# Patient Record
Sex: FEMALE | Race: WHITE | NOT HISPANIC OR LATINO
[De-identification: names, ages, dates, MRNs, and addresses within clinical notes are randomized per-mention and may not be internally consistent; named-entity substitution may affect disease eponyms.]

---

## 2017-01-30 PROBLEM — Z00.00 ENCOUNTER FOR PREVENTIVE HEALTH EXAMINATION: Status: ACTIVE | Noted: 2017-01-30

## 2017-09-14 ENCOUNTER — APPOINTMENT (OUTPATIENT)
Dept: DERMATOLOGY | Facility: CLINIC | Age: 77
End: 2017-09-14
Payer: MEDICARE

## 2017-09-14 PROCEDURE — 99203 OFFICE O/P NEW LOW 30 MIN: CPT

## 2018-02-08 ENCOUNTER — IMPORTED ENCOUNTER (OUTPATIENT)
Dept: URBAN - METROPOLITAN AREA CLINIC 50 | Facility: CLINIC | Age: 78
End: 2018-02-08

## 2018-07-27 ENCOUNTER — TRANSCRIPTION ENCOUNTER (OUTPATIENT)
Age: 78
End: 2018-07-27

## 2018-10-04 ENCOUNTER — APPOINTMENT (OUTPATIENT)
Dept: DERMATOLOGY | Facility: CLINIC | Age: 78
End: 2018-10-04

## 2018-10-05 ENCOUNTER — APPOINTMENT (OUTPATIENT)
Dept: DERMATOLOGY | Facility: CLINIC | Age: 78
End: 2018-10-05
Payer: MEDICARE

## 2018-10-05 PROCEDURE — 99214 OFFICE O/P EST MOD 30 MIN: CPT

## 2019-06-24 ENCOUNTER — OUTPATIENT (OUTPATIENT)
Dept: OUTPATIENT SERVICES | Facility: HOSPITAL | Age: 79
LOS: 1 days | Discharge: HOME | End: 2019-06-24

## 2019-06-24 VITALS
OXYGEN SATURATION: 97 % | RESPIRATION RATE: 14 BRPM | TEMPERATURE: 98 F | HEART RATE: 68 BPM | SYSTOLIC BLOOD PRESSURE: 147 MMHG | DIASTOLIC BLOOD PRESSURE: 69 MMHG

## 2019-06-24 VITALS
OXYGEN SATURATION: 100 % | SYSTOLIC BLOOD PRESSURE: 156 MMHG | HEART RATE: 72 BPM | DIASTOLIC BLOOD PRESSURE: 90 MMHG | TEMPERATURE: 98 F | RESPIRATION RATE: 14 BRPM | WEIGHT: 167.99 LBS | HEIGHT: 65 IN

## 2019-06-24 DIAGNOSIS — Z90.49 ACQUIRED ABSENCE OF OTHER SPECIFIED PARTS OF DIGESTIVE TRACT: Chronic | ICD-10-CM

## 2019-06-24 DIAGNOSIS — Z90.89 ACQUIRED ABSENCE OF OTHER ORGANS: Chronic | ICD-10-CM

## 2019-06-24 DIAGNOSIS — Z98.890 OTHER SPECIFIED POSTPROCEDURAL STATES: Chronic | ICD-10-CM

## 2019-06-24 DIAGNOSIS — Z90.11 ACQUIRED ABSENCE OF RIGHT BREAST AND NIPPLE: Chronic | ICD-10-CM

## 2019-06-24 RX ORDER — TRAMADOL HYDROCHLORIDE 50 MG/1
1 TABLET ORAL
Qty: 0 | Refills: 0 | DISCHARGE

## 2019-06-24 RX ORDER — LISINOPRIL 2.5 MG/1
1 TABLET ORAL
Qty: 0 | Refills: 0 | DISCHARGE

## 2019-06-24 RX ORDER — ASPIRIN/CALCIUM CARB/MAGNESIUM 324 MG
1 TABLET ORAL
Qty: 0 | Refills: 0 | DISCHARGE

## 2019-06-24 RX ORDER — DULOXETINE HYDROCHLORIDE 30 MG/1
1 CAPSULE, DELAYED RELEASE ORAL
Qty: 0 | Refills: 0 | DISCHARGE

## 2019-06-24 RX ORDER — RALOXIFENE HYDROCHLORIDE 60 MG/1
1 TABLET, COATED ORAL
Qty: 0 | Refills: 0 | DISCHARGE

## 2019-06-24 RX ORDER — ALPRAZOLAM 0.25 MG
1 TABLET ORAL
Qty: 0 | Refills: 0 | DISCHARGE

## 2019-06-24 RX ORDER — LEVOTHYROXINE SODIUM 125 MCG
1 TABLET ORAL
Qty: 0 | Refills: 0 | DISCHARGE

## 2019-06-24 RX ORDER — TROPICAMIDE 1 %
1 DROPS OPHTHALMIC (EYE)
Refills: 0 | Status: COMPLETED | OUTPATIENT
Start: 2019-06-24 | End: 2019-06-24

## 2019-06-24 RX ORDER — ACETAMINOPHEN 500 MG
650 TABLET ORAL EVERY 6 HOURS
Refills: 0 | Status: DISCONTINUED | OUTPATIENT
Start: 2019-06-24 | End: 2019-07-09

## 2019-06-24 RX ORDER — SIMVASTATIN 20 MG/1
1 TABLET, FILM COATED ORAL
Qty: 0 | Refills: 0 | DISCHARGE

## 2019-06-24 RX ORDER — PHENYLEPHRINE HCL 2.5 %
1 DROPS OPHTHALMIC (EYE)
Refills: 0 | Status: COMPLETED | OUTPATIENT
Start: 2019-06-24 | End: 2019-06-24

## 2019-06-24 RX ORDER — OFLOXACIN 0.3 %
1 DROPS OPHTHALMIC (EYE)
Refills: 0 | Status: COMPLETED | OUTPATIENT
Start: 2019-06-24 | End: 2019-06-24

## 2019-06-24 RX ORDER — ACETAMINOPHEN 500 MG
2 TABLET ORAL
Qty: 0 | Refills: 0 | DISCHARGE

## 2019-06-24 RX ORDER — CYCLOPENTOLATE HYDROCHLORIDE 10 MG/ML
1 SOLUTION/ DROPS OPHTHALMIC
Refills: 0 | Status: COMPLETED | OUTPATIENT
Start: 2019-06-24 | End: 2019-06-24

## 2019-06-24 RX ADMIN — Medication 1 DROP(S): at 12:47

## 2019-06-24 RX ADMIN — Medication 1 DROP(S): at 12:46

## 2019-06-24 RX ADMIN — CYCLOPENTOLATE HYDROCHLORIDE 1 DROP(S): 10 SOLUTION/ DROPS OPHTHALMIC at 12:51

## 2019-06-24 RX ADMIN — Medication 1 DROP(S): at 12:50

## 2019-06-24 RX ADMIN — Medication 1 DROP(S): at 12:53

## 2019-06-24 RX ADMIN — Medication 1 DROP(S): at 12:54

## 2019-06-24 RX ADMIN — Medication 1 DROP(S): at 12:51

## 2019-06-24 RX ADMIN — CYCLOPENTOLATE HYDROCHLORIDE 1 DROP(S): 10 SOLUTION/ DROPS OPHTHALMIC at 12:47

## 2019-06-24 RX ADMIN — CYCLOPENTOLATE HYDROCHLORIDE 1 DROP(S): 10 SOLUTION/ DROPS OPHTHALMIC at 12:54

## 2019-06-24 NOTE — ASU PATIENT PROFILE, ADULT - PSH
H/O lithotripsy    H/O mastectomy, right  with reconstruction  History of cholecystectomy    History of tonsillectomy H/O lithotripsy    H/O mastectomy, right  with reconstruction  History of back surgery    History of cholecystectomy    History of tonsillectomy

## 2019-06-28 DIAGNOSIS — Z90.11 ACQUIRED ABSENCE OF RIGHT BREAST AND NIPPLE: ICD-10-CM

## 2019-06-28 DIAGNOSIS — I10 ESSENTIAL (PRIMARY) HYPERTENSION: ICD-10-CM

## 2019-06-28 DIAGNOSIS — H25.12 AGE-RELATED NUCLEAR CATARACT, LEFT EYE: ICD-10-CM

## 2019-06-28 DIAGNOSIS — E78.5 HYPERLIPIDEMIA, UNSPECIFIED: ICD-10-CM

## 2019-06-28 DIAGNOSIS — Z90.49 ACQUIRED ABSENCE OF OTHER SPECIFIED PARTS OF DIGESTIVE TRACT: ICD-10-CM

## 2019-06-28 DIAGNOSIS — Z88.0 ALLERGY STATUS TO PENICILLIN: ICD-10-CM

## 2019-06-28 DIAGNOSIS — Z79.82 LONG TERM (CURRENT) USE OF ASPIRIN: ICD-10-CM

## 2019-08-16 ENCOUNTER — TRANSCRIPTION ENCOUNTER (OUTPATIENT)
Age: 79
End: 2019-08-16

## 2019-09-03 ENCOUNTER — TRANSCRIPTION ENCOUNTER (OUTPATIENT)
Age: 79
End: 2019-09-03

## 2019-09-26 ENCOUNTER — APPOINTMENT (OUTPATIENT)
Dept: DERMATOLOGY | Facility: CLINIC | Age: 79
End: 2019-09-26
Payer: MEDICARE

## 2019-09-26 PROBLEM — M19.90 UNSPECIFIED OSTEOARTHRITIS, UNSPECIFIED SITE: Chronic | Status: ACTIVE | Noted: 2019-06-24

## 2019-09-26 PROBLEM — M86.9 OSTEOMYELITIS, UNSPECIFIED: Chronic | Status: ACTIVE | Noted: 2019-06-24

## 2019-09-26 PROBLEM — E78.00 PURE HYPERCHOLESTEROLEMIA, UNSPECIFIED: Chronic | Status: ACTIVE | Noted: 2019-06-24

## 2019-09-26 PROBLEM — I10 ESSENTIAL (PRIMARY) HYPERTENSION: Chronic | Status: ACTIVE | Noted: 2019-06-24

## 2019-09-26 PROCEDURE — 99214 OFFICE O/P EST MOD 30 MIN: CPT | Mod: 25

## 2019-09-26 PROCEDURE — 17000 DESTRUCT PREMALG LESION: CPT

## 2020-09-24 ENCOUNTER — RESULT REVIEW (OUTPATIENT)
Age: 80
End: 2020-09-24

## 2020-09-24 ENCOUNTER — APPOINTMENT (OUTPATIENT)
Dept: DERMATOLOGY | Facility: CLINIC | Age: 80
End: 2020-09-24
Payer: MEDICARE

## 2020-09-24 PROCEDURE — 99214 OFFICE O/P EST MOD 30 MIN: CPT | Mod: 25

## 2020-09-24 PROCEDURE — 11102 TANGNTL BX SKIN SINGLE LES: CPT

## 2020-10-07 ENCOUNTER — APPOINTMENT (OUTPATIENT)
Dept: DERMATOLOGY | Facility: CLINIC | Age: 80
End: 2020-10-07
Payer: MEDICARE

## 2020-10-07 PROCEDURE — 99212 OFFICE O/P EST SF 10 MIN: CPT | Mod: 25

## 2020-10-07 PROCEDURE — 17262 DSTRJ MAL LES T/A/L 1.1-2.0: CPT

## 2021-04-17 ASSESSMENT — TONOMETRY
OS_IOP_MMHG: 18
OD_IOP_MMHG: 18

## 2021-04-17 ASSESSMENT — VISUAL ACUITY
OD_CC: 20/20
OS_OTHER: 20/40. 20/70.
OS_CC: 20/30-
OS_CC: J1+
OD_CC: J1+
OS_BAT: 20/40
OS_PH: 20/20

## 2021-06-15 ENCOUNTER — APPOINTMENT (OUTPATIENT)
Dept: DERMATOLOGY | Facility: CLINIC | Age: 81
End: 2021-06-15
Payer: MEDICARE

## 2021-06-15 PROCEDURE — 99213 OFFICE O/P EST LOW 20 MIN: CPT

## 2021-09-15 ENCOUNTER — APPOINTMENT (OUTPATIENT)
Dept: DERMATOLOGY | Facility: CLINIC | Age: 81
End: 2021-09-15

## 2021-09-24 ENCOUNTER — APPOINTMENT (OUTPATIENT)
Dept: DERMATOLOGY | Facility: CLINIC | Age: 81
End: 2021-09-24
Payer: MEDICARE

## 2021-09-24 ENCOUNTER — APPOINTMENT (OUTPATIENT)
Dept: DERMATOLOGY | Facility: CLINIC | Age: 81
End: 2021-09-24

## 2021-09-24 PROCEDURE — 99213 OFFICE O/P EST LOW 20 MIN: CPT

## 2022-06-10 ENCOUNTER — APPOINTMENT (OUTPATIENT)
Dept: DERMATOLOGY | Facility: CLINIC | Age: 82
End: 2022-06-10

## 2022-06-15 ENCOUNTER — APPOINTMENT (OUTPATIENT)
Dept: DERMATOLOGY | Facility: CLINIC | Age: 82
End: 2022-06-15

## 2022-08-03 ENCOUNTER — APPOINTMENT (OUTPATIENT)
Dept: DERMATOLOGY | Facility: CLINIC | Age: 82
End: 2022-08-03

## 2022-08-03 PROCEDURE — 99213 OFFICE O/P EST LOW 20 MIN: CPT

## 2023-06-12 ENCOUNTER — RESULT REVIEW (OUTPATIENT)
Age: 83
End: 2023-06-12

## 2023-06-13 ENCOUNTER — APPOINTMENT (OUTPATIENT)
Dept: DERMATOLOGY | Facility: CLINIC | Age: 83
End: 2023-06-13
Payer: MEDICARE

## 2023-06-13 PROCEDURE — 99214 OFFICE O/P EST MOD 30 MIN: CPT | Mod: 25

## 2023-06-13 PROCEDURE — 11102 TANGNTL BX SKIN SINGLE LES: CPT

## 2023-07-05 ENCOUNTER — APPOINTMENT (OUTPATIENT)
Dept: DERMATOLOGY | Facility: CLINIC | Age: 83
End: 2023-07-05
Payer: MEDICARE

## 2023-07-05 PROCEDURE — 17311 MOHS 1 STAGE H/N/HF/G: CPT

## 2023-07-05 PROCEDURE — 13152 CMPLX RPR E/N/E/L 2.6-7.5 CM: CPT

## 2023-07-05 PROCEDURE — 17312 MOHS ADDL STAGE: CPT

## 2023-07-05 RX ORDER — DOXYCYCLINE HYCLATE 100 MG/1
100 CAPSULE ORAL
Qty: 14 | Refills: 0 | Status: ACTIVE | COMMUNITY
Start: 2023-07-05 | End: 1900-01-01

## 2023-07-06 ENCOUNTER — APPOINTMENT (OUTPATIENT)
Dept: DERMATOLOGY | Facility: CLINIC | Age: 83
End: 2023-07-06

## 2023-07-12 ENCOUNTER — APPOINTMENT (OUTPATIENT)
Dept: DERMATOLOGY | Facility: CLINIC | Age: 83
End: 2023-07-12
Payer: MEDICARE

## 2023-07-12 DIAGNOSIS — C44.311 BASAL CELL CARCINOMA OF SKIN OF NOSE: ICD-10-CM

## 2023-07-12 PROCEDURE — 99024 POSTOP FOLLOW-UP VISIT: CPT

## 2023-07-13 PROBLEM — C44.311 BASAL CELL CARCINOMA (BCC) OF DORSUM OF NOSE: Status: ACTIVE | Noted: 2023-07-05

## 2023-08-03 ENCOUNTER — APPOINTMENT (OUTPATIENT)
Dept: DERMATOLOGY | Facility: CLINIC | Age: 83
End: 2023-08-03

## 2023-10-04 ENCOUNTER — APPOINTMENT (OUTPATIENT)
Dept: DERMATOLOGY | Facility: CLINIC | Age: 83
End: 2023-10-04
Payer: MEDICARE

## 2023-10-04 PROCEDURE — 99213 OFFICE O/P EST LOW 20 MIN: CPT

## 2024-07-16 ENCOUNTER — APPOINTMENT (OUTPATIENT)
Dept: DERMATOLOGY | Facility: CLINIC | Age: 84
End: 2024-07-16
Payer: MEDICARE

## 2024-07-16 PROCEDURE — 99213 OFFICE O/P EST LOW 20 MIN: CPT

## 2024-07-25 ENCOUNTER — NON-APPOINTMENT (OUTPATIENT)
Age: 84
End: 2024-07-25

## 2024-08-08 ENCOUNTER — APPOINTMENT (OUTPATIENT)
Dept: PULMONOLOGY | Facility: CLINIC | Age: 84
End: 2024-08-08

## 2024-08-08 PROBLEM — R09.82 POST-NASAL DRIP: Status: ACTIVE | Noted: 2024-08-08

## 2024-08-08 PROBLEM — J42 CHRONIC BRONCHITIS: Status: ACTIVE | Noted: 2024-08-08

## 2024-08-08 PROBLEM — R05.3 CHRONIC COUGH: Status: ACTIVE | Noted: 2024-08-08

## 2024-08-08 PROCEDURE — 99204 OFFICE O/P NEW MOD 45 MIN: CPT

## 2024-08-08 PROCEDURE — G2211 COMPLEX E/M VISIT ADD ON: CPT

## 2024-08-08 NOTE — ASSESSMENT
[FreeTextEntry1] : 83F PMH former heavy smoker, chronic bronchitis, severe spinal infection s/p spine fusion (2014) on chronic Doxycycline, HTN, postnasal drip, BCC of nose who presents for initial pulmonary evaluation.   - Pt likely has ongoing bronchitis, possible COPD given her heavy smoking history - She was on Trelegy previously but misplaced her supply - Will refill Trelegy 200 - Increase Flonase to Dymista given her ongoing post-nasal drip - Checking CT chest given ongoing cough and former smoking history - Will f/u in short interim for full PFT  The patient expressed understanding and agreement with the plan as outlined above and accepts responsibility to be compliant with any recommended testing, treatment, and follow-up visits.  All relevant questions and concerns were addressed.  45 minutes of time were spent on the encounter. Medical records were reviewed, including but not limited to hospital records, outpatient records, laboratory data, and diagnostic imaging studies. Greater than 50% of the face-to-face encounter time was spent on counseling and/or coordination of care.  Chi Stone MD, [facility] Critical [facility] Medicine [facility] at [place] [address] [place], [place] [address] T: [phone] F: [phone]

## 2024-08-08 NOTE — RESULTS/DATA
[TextEntry] : MANISHA Procedure was performed at the [facility]  EXAM: CHEST PA LATERAL   PROCEDURE DATE: 07/26/2024   INTERPRETATION: CLINICAL INFORMATION: Chronic cough.  PA and lateral views of the chest were obtained.  Comparison: None.  Findings: The lungs are clear. Azygos fissure. The heart size is normal. Status post thoracic spinal fusion.  Impression: Clear lungs.  --- End of Report ---       KATE ROBERSON MD; Attending Radiologist This document has been electronically signed. Jul 26 2024 7:30PM  ~~~~~~~~~~~~~~~~~~~~~~~~~~~~~~~~~~~~~~~~~~~~~~~~~~~~~~~~~~~~~~~~~~~~~~~~

## 2024-08-08 NOTE — HISTORY OF PRESENT ILLNESS
[Former] : former [TextBox_4] : 83F PMH former heavy smoker, chronic bronchitis, severe spinal infection s/p spine fusion (2014) on chronic Doxycycline, HTN, postnasal drip, BCC of nose who presents for initial pulmonary evaluation. On 7/26/24 was seen at urgent care [facility] for cough, cold-like symptoms, cough, given Tessalon perles and Ventolin and referred here. Had clear CXR. She reports since 05/2024, had COVID and then since then her cough has persisted. She was given Ventolin at urgent care but it caused her to vomit.  [TextBox_11] : 1 [TextBox_13] : 40 [YearQuit] : 1990s

## 2024-09-04 RX ORDER — BUDESONIDE, GLYCOPYRROLATE, AND FORMOTEROL FUMARATE 160; 9; 4.8 UG/1; UG/1; UG/1
160-9-4.8 AEROSOL, METERED RESPIRATORY (INHALATION) TWICE DAILY
Qty: 1 | Refills: 5 | Status: ACTIVE | COMMUNITY
Start: 2024-09-04 | End: 1900-01-01

## 2024-09-18 ENCOUNTER — APPOINTMENT (OUTPATIENT)
Dept: PULMONOLOGY | Facility: CLINIC | Age: 84
End: 2024-09-18
Payer: MEDICARE

## 2024-09-18 VITALS
RESPIRATION RATE: 16 BRPM | SYSTOLIC BLOOD PRESSURE: 124 MMHG | OXYGEN SATURATION: 97 % | DIASTOLIC BLOOD PRESSURE: 70 MMHG | HEART RATE: 102 BPM

## 2024-09-18 VITALS — HEIGHT: 63 IN | BODY MASS INDEX: 32.78 KG/M2 | WEIGHT: 185 LBS

## 2024-09-18 DIAGNOSIS — R05.3 CHRONIC COUGH: ICD-10-CM

## 2024-09-18 PROCEDURE — 85018 HEMOGLOBIN: CPT | Mod: QW

## 2024-09-18 PROCEDURE — 99214 OFFICE O/P EST MOD 30 MIN: CPT | Mod: 25

## 2024-09-18 PROCEDURE — 94727 GAS DIL/WSHOT DETER LNG VOL: CPT

## 2024-09-18 PROCEDURE — 94010 BREATHING CAPACITY TEST: CPT

## 2024-09-18 PROCEDURE — 94729 DIFFUSING CAPACITY: CPT

## 2024-09-18 NOTE — ASSESSMENT
[FreeTextEntry1] : 83F PMH former heavy smoker, chronic bronchitis, severe spinal infection s/p spine fusion (2014) on chronic Doxycycline, HTN, postnasal drip, BCC of nose who presents for f/u visit.   - CT 8/27/24 showing mild bronchial thickening, interstitial thickening in LLL, as well as nodules measuring up to 5mm RUL, RLL, GIOVANNI.  - Repeat CT 08/2025 - PFT today showed FEV1/FVC 86%, FEV1 81%, FVC 72%, [id number] 148%, %, DLCOc 54%, DL/%.  - This is a normal PFT - which could be falsely normalized due to being on Trelegy - Cough improved after starting Trelegy and Dymista - I will refer to ENT Dr. Freeman - However, pt is moving to [place] permanently at the end of this month - She should see an ENT in the next few months and should f/u with her next pulmonologist within 6 mo - I recommended she try using NeilMed Sinus rinse in the meantime - I refilled her Trelegy  - Will have her sign medical release forms  The patient expressed understanding and agreement with the plan as outlined above and accepts responsibility to be compliant with any recommended testing, treatment, and follow-up visits.  All relevant questions and concerns were addressed.  30 minutes of time were spent on the encounter. Medical records were reviewed, including but not limited to hospital records, outpatient records, laboratory data, and diagnostic imaging studies. Greater than 50% of the face-to-face encounter time was spent on counseling and/or coordination of care.  Chi Stone MD, [facility] Pulmonary & Critical Care Medicine [facility] at [place] [address] [place], [place] [address] T: [phone] F: [phone]

## 2024-09-18 NOTE — RESULTS/DATA
[TextEntry] : YANCAMI PATIENT NAME: Kimberly Blunt PATIENT PHONE NUMBER: [phone] PATIENT ID: [id number] : 1940 DATE OF EXAM: 2024 R. Phys. Name: Chi Stone [facility] Phys. Address: [address] [place] [address] R Phys. Phone: [phone] CT-CHEST NON CONTRAST  History: R05.3 Chronic Cough R06.2 Wheezing J40 Bronchitis Z87.891 previous smoker  Technique: CT scan of the chest was performed without contrast. Axial, sagittal and coronal images were reconstructed. This study was performed using automatic exposure control and an iterative reconstruction technique (radiation dose reduction software) to obtain a diagnostic image quality scan with patient dose as low as reasonably achievable. The mA and kV were adjusted according to patient size. The administered radiation dose was 2.26 mSv.   COMPARISON: None.  THYROID: The visualized thyroid gland is unremarkable.  MEDIASTINUM / DANIA / LYMPH NODES: No suspicious mass or lymphadenopathy.  HEART / VASCULAR STRUCTURES: Normal heart size. Lipomatous hypertrophy of the interatrial septum. No pericardial effusion. Normal caliber of the aorta and pulmonary artery.  LUNGS: There is mild bronchial thickening, interstitial thickening in the left lower lobe, an azygous fissure (normal variant), and scattered nodules measuring up to 5 mm, located in the right upper lobe (series 4 image 52, 67, 142), right lower lobe (199, 226) and left upper lobe (178). There is no evidence of bronchiectasis or infiltrate.  PLEURA: There is no pleural effusion.  UPPER ABDOMEN: S/P cholecystectomy. Fatty liver.  BONES: Extensive hardware in the thoracic spine.  CORONARY CALCIUM FINDINGS: Mild calcium noted within coronary arteries.  IMPRESSION:   Mild bronchial thickening, and interstitial thickening in the left lower lobe.  Nodules measuring up to 5 mm; Fleischner recommendation in patients with a smoking history is optional follow-up CT in 12 months.  Signed by: Geronimo Vazquez MD Signed Date: 2024 11:47 PM EDT    SIGNED BY: Geronimo Vazquez M.D., Ext. [phone] 11:47 PM  ~~~~~~~~~~~~~~~~~~~~~~~~~~~~~~~~~~~~~~~~~~~~~~~~~~~~~~~~~~~~~~~~~~~~~~~~

## 2024-09-18 NOTE — HISTORY OF PRESENT ILLNESS
[Former] : former [TextBox_4] : 83F PMH former heavy smoker, chronic bronchitis, severe spinal infection s/p spine fusion (2014) on chronic Doxycycline, HTN, postnasal drip, BCC of nose who presents for f/u visit. She is on Trelegy 200 and Dymista. Had CT 8/27/24 showing mild bronchial thickening, interstitial thickening in LLL, as well as nodules measuring up to 5mm RUL, RLL, GIOVANNI. PFT today showed FEV1/FVC 86%, FEV1 81%, FVC 72%, [id number] 148%, %, DLCOc 54%, DL/%. She still has cough but improved on Trelegy. She feels the Dymista improved her cough more than the Trelegy. She has not seen an ENT yet.  [TextBox_11] : 1 [TextBox_13] : 41 [YearQuit] : 1990s